# Patient Record
Sex: MALE | Race: WHITE
[De-identification: names, ages, dates, MRNs, and addresses within clinical notes are randomized per-mention and may not be internally consistent; named-entity substitution may affect disease eponyms.]

---

## 2018-05-30 ENCOUNTER — APPOINTMENT (OUTPATIENT)
Dept: PULMONOLOGY | Facility: CLINIC | Age: 58
End: 2018-05-30
Payer: COMMERCIAL

## 2018-05-30 ENCOUNTER — NON-APPOINTMENT (OUTPATIENT)
Age: 58
End: 2018-05-30

## 2018-05-30 VITALS
DIASTOLIC BLOOD PRESSURE: 92 MMHG | HEART RATE: 98 BPM | BODY MASS INDEX: 31.86 KG/M2 | OXYGEN SATURATION: 98 % | SYSTOLIC BLOOD PRESSURE: 132 MMHG | WEIGHT: 203 LBS | HEIGHT: 67 IN | RESPIRATION RATE: 17 BRPM

## 2018-05-30 DIAGNOSIS — Z78.9 OTHER SPECIFIED HEALTH STATUS: ICD-10-CM

## 2018-05-30 DIAGNOSIS — Z81.8 FAMILY HISTORY OF OTHER MENTAL AND BEHAVIORAL DISORDERS: ICD-10-CM

## 2018-05-30 DIAGNOSIS — Z86.59 PERSONAL HISTORY OF OTHER MENTAL AND BEHAVIORAL DISORDERS: ICD-10-CM

## 2018-05-30 DIAGNOSIS — Z82.49 FAMILY HISTORY OF ISCHEMIC HEART DISEASE AND OTHER DISEASES OF THE CIRCULATORY SYSTEM: ICD-10-CM

## 2018-05-30 DIAGNOSIS — Z83.3 FAMILY HISTORY OF DIABETES MELLITUS: ICD-10-CM

## 2018-05-30 DIAGNOSIS — Z83.49 FAMILY HISTORY OF OTHER ENDOCRINE, NUTRITIONAL AND METABOLIC DISEASES: ICD-10-CM

## 2018-05-30 DIAGNOSIS — Z86.39 PERSONAL HISTORY OF OTHER ENDOCRINE, NUTRITIONAL AND METABOLIC DISEASE: ICD-10-CM

## 2018-05-30 DIAGNOSIS — Z80.7 FAMILY HISTORY OF OTHER MALIGNANT NEOPLASMS OF LYMPHOID, HEMATOPOIETIC AND RELATED TISSUES: ICD-10-CM

## 2018-05-30 PROBLEM — Z00.00 ENCOUNTER FOR PREVENTIVE HEALTH EXAMINATION: Status: ACTIVE | Noted: 2018-05-30

## 2018-05-30 PROCEDURE — 99204 OFFICE O/P NEW MOD 45 MIN: CPT | Mod: 25

## 2018-05-30 PROCEDURE — 94010 BREATHING CAPACITY TEST: CPT

## 2018-05-30 PROCEDURE — 71046 X-RAY EXAM CHEST 2 VIEWS: CPT

## 2018-05-30 RX ORDER — MONTELUKAST SODIUM 10 MG/1
10 TABLET, FILM COATED ORAL
Qty: 1 | Refills: 2 | Status: ACTIVE | COMMUNITY
Start: 2018-05-30 | End: 1900-01-01

## 2018-05-30 RX ORDER — PANTOPRAZOLE 20 MG/1
20 TABLET, DELAYED RELEASE ORAL
Qty: 60 | Refills: 0 | Status: ACTIVE | COMMUNITY
Start: 2017-05-12

## 2018-05-30 RX ORDER — CLOBETASOL PROPIONATE 0.5 MG/G
0.05 OINTMENT TOPICAL
Qty: 15 | Refills: 0 | Status: ACTIVE | COMMUNITY
Start: 2018-05-29

## 2018-05-30 RX ORDER — CLONAZEPAM 0.25 MG/1
0.25 TABLET, ORALLY DISINTEGRATING ORAL
Qty: 90 | Refills: 0 | Status: ACTIVE | COMMUNITY
Start: 2017-12-03

## 2018-05-30 RX ORDER — ALBUTEROL SULFATE 90 UG/1
108 (90 BASE) AEROSOL, METERED RESPIRATORY (INHALATION) EVERY 6 HOURS
Qty: 1 | Refills: 3 | Status: ACTIVE | COMMUNITY
Start: 2018-05-30 | End: 1900-01-01

## 2018-05-30 RX ORDER — CEPHALEXIN 500 MG/1
500 CAPSULE ORAL
Qty: 20 | Refills: 0 | Status: DISCONTINUED | COMMUNITY
Start: 2018-04-21

## 2018-05-30 RX ORDER — ROSUVASTATIN CALCIUM 10 MG/1
10 TABLET, FILM COATED ORAL
Qty: 30 | Refills: 0 | Status: ACTIVE | COMMUNITY
Start: 2018-04-28

## 2018-05-30 RX ORDER — FLUOCINOLONE ACETONIDE 0.25 MG/G
0.03 OINTMENT TOPICAL
Qty: 15 | Refills: 0 | Status: ACTIVE | COMMUNITY
Start: 2018-05-29

## 2018-05-30 RX ORDER — ALBUTEROL SULFATE 2.5 MG/3ML
(2.5 MG/3ML) SOLUTION RESPIRATORY (INHALATION)
Qty: 225 | Refills: 0 | Status: ACTIVE | COMMUNITY
Start: 2018-05-19

## 2018-07-25 ENCOUNTER — APPOINTMENT (OUTPATIENT)
Dept: PULMONOLOGY | Facility: CLINIC | Age: 58
End: 2018-07-25
Payer: COMMERCIAL

## 2018-07-25 ENCOUNTER — NON-APPOINTMENT (OUTPATIENT)
Age: 58
End: 2018-07-25

## 2018-07-25 VITALS
SYSTOLIC BLOOD PRESSURE: 130 MMHG | OXYGEN SATURATION: 98 % | RESPIRATION RATE: 16 BRPM | HEIGHT: 66 IN | HEART RATE: 74 BPM | DIASTOLIC BLOOD PRESSURE: 80 MMHG | WEIGHT: 198 LBS | BODY MASS INDEX: 31.82 KG/M2

## 2018-07-25 PROCEDURE — 99214 OFFICE O/P EST MOD 30 MIN: CPT | Mod: 25

## 2018-07-25 PROCEDURE — 94010 BREATHING CAPACITY TEST: CPT

## 2018-07-25 PROCEDURE — 94727 GAS DIL/WSHOT DETER LNG VOL: CPT

## 2018-07-25 PROCEDURE — 94729 DIFFUSING CAPACITY: CPT

## 2018-07-25 RX ORDER — HYDROCODONE BITARTRATE AND HOMATROPINE METHYLBROMIDE 5; 1.5 MG/5ML; MG/5ML
5-1.5 SYRUP ORAL
Qty: 100 | Refills: 0 | Status: DISCONTINUED | COMMUNITY
Start: 2018-05-08 | End: 2018-07-25

## 2018-07-25 RX ORDER — PREDNISONE 10 MG/1
10 TABLET ORAL
Qty: 50 | Refills: 0 | Status: DISCONTINUED | COMMUNITY
Start: 2018-05-17 | End: 2018-07-25

## 2018-11-20 ENCOUNTER — LABORATORY RESULT (OUTPATIENT)
Age: 58
End: 2018-11-20

## 2018-11-20 LAB
24R-OH-CALCIDIOL SERPL-MCNC: 71.5 PG/ML
25(OH)D3 SERPL-MCNC: 34.5 NG/ML
ALBUMIN SERPL ELPH-MCNC: 4.3 G/DL
ALP BLD-CCNC: 48 U/L
ALT SERPL-CCNC: 25 U/L
ANION GAP SERPL CALC-SCNC: 13 MMOL/L
AST SERPL-CCNC: 24 U/L
BASOPHILS # BLD AUTO: 0.04 K/UL
BASOPHILS NFR BLD AUTO: 0.7 %
BILIRUB SERPL-MCNC: 0.6 MG/DL
BUN SERPL-MCNC: 21 MG/DL
CALCIUM SERPL-MCNC: 9.7 MG/DL
CHLORIDE SERPL-SCNC: 101 MMOL/L
CHOLEST SERPL-MCNC: 176 MG/DL
CHOLEST/HDLC SERPL: 3.2 RATIO
CO2 SERPL-SCNC: 27 MMOL/L
CREAT SERPL-MCNC: 1.03 MG/DL
EOSINOPHIL # BLD AUTO: 0.11 K/UL
EOSINOPHIL NFR BLD AUTO: 2 %
GLUCOSE SERPL-MCNC: 112 MG/DL
HBA1C MFR BLD HPLC: 5.5 %
HCT VFR BLD CALC: 47 %
HDLC SERPL-MCNC: 55 MG/DL
HGB BLD-MCNC: 16.4 G/DL
IGE SER-MCNC: 4 IU/ML
IMM GRANULOCYTES NFR BLD AUTO: 0.2 %
LDLC SERPL CALC-MCNC: 105 MG/DL
LYMPHOCYTES # BLD AUTO: 1.44 K/UL
LYMPHOCYTES NFR BLD AUTO: 26.2 %
MAN DIFF?: NORMAL
MCHC RBC-ENTMCNC: 31.6 PG
MCHC RBC-ENTMCNC: 34.9 GM/DL
MCV RBC AUTO: 90.6 FL
MONOCYTES # BLD AUTO: 0.44 K/UL
MONOCYTES NFR BLD AUTO: 8 %
NEUTROPHILS # BLD AUTO: 3.46 K/UL
NEUTROPHILS NFR BLD AUTO: 62.9 %
PLATELET # BLD AUTO: 157 K/UL
POTASSIUM SERPL-SCNC: 5 MMOL/L
PROT SERPL-MCNC: 6.8 G/DL
RBC # BLD: 5.19 M/UL
RBC # FLD: 14.9 %
SODIUM SERPL-SCNC: 141 MMOL/L
T3FREE SERPL-MCNC: 3.3 PG/ML
T4 FREE SERPL-MCNC: 1.2 NG/DL
TRIGL SERPL-MCNC: 80 MG/DL
TSH SERPL-ACNC: 2 UIU/ML
WBC # FLD AUTO: 5.5 K/UL

## 2018-11-27 ENCOUNTER — NON-APPOINTMENT (OUTPATIENT)
Age: 58
End: 2018-11-27

## 2018-11-27 ENCOUNTER — APPOINTMENT (OUTPATIENT)
Dept: PULMONOLOGY | Facility: CLINIC | Age: 58
End: 2018-11-27
Payer: COMMERCIAL

## 2018-11-27 VITALS
WEIGHT: 207 LBS | DIASTOLIC BLOOD PRESSURE: 80 MMHG | HEIGHT: 68 IN | HEART RATE: 64 BPM | SYSTOLIC BLOOD PRESSURE: 126 MMHG | BODY MASS INDEX: 31.37 KG/M2 | OXYGEN SATURATION: 98 %

## 2018-11-27 DIAGNOSIS — R05 COUGH: ICD-10-CM

## 2018-11-27 PROCEDURE — 95012 NITRIC OXIDE EXP GAS DETER: CPT

## 2018-11-27 PROCEDURE — 99214 OFFICE O/P EST MOD 30 MIN: CPT | Mod: 25

## 2018-11-27 PROCEDURE — 94010 BREATHING CAPACITY TEST: CPT

## 2018-11-27 NOTE — REASON FOR VISIT
[Follow-Up] : a follow-up visit [FreeTextEntry1] : allergic rhinitis, chronic cough, GERD, overweight, RAD, snoring and SOB

## 2018-11-27 NOTE — ASSESSMENT
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 with a history of HLD, non-smoker, anxiety, adult onset allergies, and mild asthma who now comes in for pulmonary re-evaluation.\par \par His shortness of breath is felt to be multifactorial due to:\par -overweight\par -out of shape\par -poor breathing mechanics\par -no evidence of cardiac disease\par -consistent with asthmatic bronchitis\par \par His asthma has contribution from:\par -underlying allergic rhinitis\par -secondary GERD \par \par problem 1: Asthma\par -add Ventolin 2 puffs Q6H\par -continue albuterol by the nebulizer BID, prn \par -followed by Advair 250 at 1 inhalation BID - off \par -continue Singulair 10 mg QHS \par \par -Asthma is  believed to be caused by inherited (genetic) and environmental factor, but its exact cause is unknown. Asthma may be triggered by allergens, lung infections, or irritants in the air. Asthma triggers are different for each person\par -Inhaler technique reviewed as well as oral hygiene techniques reviewed with patient. Avoidance of cold air, extremes of temperature, rescue inhaler should be used before exercise. Order of medication reviewed with patient. Recommended use of a cool mist humidifier in the bedroom. \par \par problem 2: allergic rhinitis\par -continue Flonase Sensimist 1 sniff/nostril BID\par -continue Allegra 180 mg QAM \par -continue Clarinex 5 mg QHS \par \par -Environmental measures for allergies were encouraged including mattress and pillow cover, air purifier, and environmental controls.\par \par problem 3: GERD / LPRD\par -continue Protonix 40 mg before breakfast \par -continue Zantac 300 mg QHS \par -Rule of 2s: avoid eating too much, eating too late, eating too spicy, eating two hours before bed\par -Things to avoid including overeating, spicy foods, tight clothing, eating within three hours of bed, this list is not all inclusive. \par -For treatment of reflux, possible options discussed including diet control, H2 blockers, PPIs, as well as coating motility agents discussed as treatment options. Timing of meals and proximity of last meal to sleep were discussed. If symptoms persist, a formal gastrointestinal evaluation is needed. \par \par problem 4: r/o allergies\par - S/p blood work (all negative): IgE level, eosinophil level, vitamin D level, food IgE level, and asthma profile \par \par problem 5: poor breathing mechanics\par -Proper breathing techniques were reviewed with an emphasis of exhalation. Patient instructed to breath in for 1 second and out for four seconds. Patient was encouraged to not talk while walking.\par \par problem 6: ? Snoring\par -recommended to consider a sleep study if he becomes more symptomatic \par \par -Sleep apnea is associated with adverse clinical consequences which an affect most organ systems.  Cardiovascular disease risk includes arrhythmias, atrial fibrillation, hypertension, coronary artery disease, and stroke. Metabolic disorders include diabetes type 2, non-alcoholic fatty liver disease. Mood disorder especially depression; and cognitive decline especially in the elderly. Associations with  chronic reflux/Quesada’s esophagus some but not all inclusive. \par -Reasons to assess this include arousal consistent with hypopnea; respiratory events most prominent in REM sleep or supine position; therefore sleep staging and body position are important for accurate diagnosis and estimation of AHI. \par \par problem 7: overweight / out of shape\par -Weight loss, exercise, and diet control were discussed and are highly encouraged. Treatment options were given such as, aqua therapy, and contacting a nutritionist. Recommended to use the elliptical, stationary bike, less use of treadmill.  Obesity is associated with worsening asthma, shortness of breath, and potential for cardiac disease, diabetes, and other underlying medical conditions.\par \par problem 8: blood work (negative)\par - S/p thyroid function tests, lipid profile, Hgb A1C, free and total testosterone level, CMP, CBC with diff \par \par problem 9: health maintenance \par - Received an influenza vaccine 2018\par -recommended strep pneumonia vaccines: Prevnar-13 vaccine, followed by Pneumo vaccine 23 one year following\par -recommended early intervention for URIs\par -recommended regular osteoporosis evaluations\par -recommended early dermatological evaluations\par -recommended after the age of 50 to the age of 70, colonoscopy every 5 years \par \par F/U in 6 months spi \par He is encouraged to call with any changes, concerns, or questions.

## 2018-11-27 NOTE — ADDENDUM
[FreeTextEntry1] : Documented by Jorge Tidwell acting as a scribe for Dr. Sami Ward on 11/27/18\par \par All medical record entries made by the Scribe were at my, Dr. Sami Ward's, direction and personally dictated by me on 11/27/18. I have reviewed the chart and agree that the record accurately reflects my personal performance of the history, physical exam, assessment and plan. I have also personally directed, reviewed, and agree with the discharge instructions. \par \par \par \par \par

## 2018-11-27 NOTE — HISTORY OF PRESENT ILLNESS
[FreeTextEntry1] : Mr. Mcmahan is a 58 year old male with a history of allergic rhinitis, chronic cough, GERD, overweight, RAD, snoring and SOB presenting to the office today for a follow up visit. His chief complaint is sleep\par - He comes in stating that he feels fine. \par - He states that he has been sleeping more, but he still feels that he can be sleeping more. \par - On a scale of 1-10, his energy level is a 8-9 \par - He has not been snoring as much as he had been. \par -  He denies any headaches, nausea, vomiting, fever, chills, sweats, chest pain, chest pressure, palpitations, SOB, coughing, wheezing,  diarrhea, constipation, dysphagia, myalgias, dizziness, leg swelling, leg pain, itchy eyes, itchy ears, heartburn, reflux, or sour taste in the mouth.

## 2018-11-29 ENCOUNTER — TRANSCRIPTION ENCOUNTER (OUTPATIENT)
Age: 58
End: 2018-11-29

## 2018-11-30 LAB
A ALTERNATA IGE QN: <0.1 KUA/L
A FUMIGATUS IGE QN: <0.1 KUA/L
C ALBICANS IGE QN: <0.1 KUA/L
C HERBARUM IGE QN: <0.1 KUA/L
CAT DANDER IGE QN: <0.1 KUA/L
CLAM IGE QN: <0.1 KUA/L
CODFISH IGE QN: <0.1 KUA/L
COMMON RAGWEED IGE QN: NORMAL
CORN IGE QN: <0.1 KUA/L
COW MILK IGE QN: <0.1 KUA/L
D FARINAE IGE QN: 0.81 KUA/L
D PTERONYSS IGE QN: 0.34 KUA/L
DEPRECATED A ALTERNATA IGE RAST QL: 0
DEPRECATED A FUMIGATUS IGE RAST QL: 0
DEPRECATED C ALBICANS IGE RAST QL: 0
DEPRECATED C HERBARUM IGE RAST QL: 0
DEPRECATED CAT DANDER IGE RAST QL: 0
DEPRECATED CLAM IGE RAST QL: 0
DEPRECATED CODFISH IGE RAST QL: 0
DEPRECATED COMMON RAGWEED IGE RAST QL: NORMAL
DEPRECATED CORN IGE RAST QL: 0
DEPRECATED COW MILK IGE RAST QL: 0
DEPRECATED D FARINAE IGE RAST QL: ABNORMAL
DEPRECATED D PTERONYSS IGE RAST QL: NORMAL
DEPRECATED DOG DANDER IGE RAST QL: NORMAL
DEPRECATED EGG WHITE IGE RAST QL: 0
DEPRECATED M RACEMOSUS IGE RAST QL: 0
DEPRECATED PEANUT IGE RAST QL: 0
DEPRECATED ROACH IGE RAST QL: 0
DEPRECATED SCALLOP IGE RAST QL: <0.1 KUA/L
DEPRECATED SESAME SEED IGE RAST QL: 0
DEPRECATED SHRIMP IGE RAST QL: NORMAL
DEPRECATED SOYBEAN IGE RAST QL: NORMAL
DEPRECATED TIMOTHY IGE RAST QL: NORMAL
DEPRECATED WALNUT IGE RAST QL: 0
DEPRECATED WHEAT IGE RAST QL: 0
DEPRECATED WHITE OAK IGE RAST QL: 0
DOG DANDER IGE QN: 0.19 KUA/L
EGG WHITE IGE QN: <0.1 KUA/L
M RACEMOSUS IGE QN: <0.1 KUA/L
PEANUT IGE QN: <0.1 KUA/L
ROACH IGE QN: <0.1 KUA/L
SCALLOP IGE QN: 0
SCALLOP IGE QN: 0.12 KUA/L
SESAME SEED IGE QN: <0.1 KUA/L
SOYBEAN IGE QN: NORMAL
TIMOTHY IGE QN: NORMAL
WALNUT IGE QN: <0.1 KUA/L
WHEAT IGE QN: <0.1 KUA/L
WHITE OAK IGE QN: <0.1 KUA/L

## 2019-07-21 ENCOUNTER — RX RENEWAL (OUTPATIENT)
Age: 59
End: 2019-07-21

## 2019-07-21 RX ORDER — DESLORATADINE 5 MG/1
5 TABLET ORAL
Qty: 90 | Refills: 1 | Status: ACTIVE | COMMUNITY
Start: 2019-07-21 | End: 1900-01-01

## 2019-07-21 RX ORDER — MONTELUKAST 10 MG/1
10 TABLET, FILM COATED ORAL
Qty: 90 | Refills: 4 | Status: ACTIVE | COMMUNITY
Start: 2019-07-21 | End: 1900-01-01

## 2020-11-24 ENCOUNTER — TRANSCRIPTION ENCOUNTER (OUTPATIENT)
Age: 60
End: 2020-11-24

## 2021-09-22 ENCOUNTER — TRANSCRIPTION ENCOUNTER (OUTPATIENT)
Age: 61
End: 2021-09-22

## 2021-10-20 ENCOUNTER — TRANSCRIPTION ENCOUNTER (OUTPATIENT)
Age: 61
End: 2021-10-20

## 2022-07-15 ENCOUNTER — APPOINTMENT (OUTPATIENT)
Dept: ORTHOPEDIC SURGERY | Facility: CLINIC | Age: 62
End: 2022-07-15

## 2022-07-15 VITALS — BODY MASS INDEX: 26.37 KG/M2 | WEIGHT: 170 LBS | HEIGHT: 67.5 IN

## 2022-07-15 DIAGNOSIS — Q66.10 CONGEN TALIPES CALCANEOVARUS, UNSP FOOT: ICD-10-CM

## 2022-07-15 DIAGNOSIS — M76.72 PERONEAL TENDINITIS, LEFT LEG: ICD-10-CM

## 2022-07-15 PROCEDURE — 73610 X-RAY EXAM OF ANKLE: CPT | Mod: LT

## 2022-07-15 NOTE — DISCUSSION/SUMMARY
[de-identified] : Recommend peroneal strengthening. \par Lateral heel wedges , would like to review orthotics

## 2022-07-15 NOTE — HISTORY OF PRESENT ILLNESS
[3] : 3 [7] : 7 [Dull/Aching] : dull/aching [Localized] : localized [Throbbing] : throbbing [de-identified] : 7/15/22: L ankle injury on 7/1/22 after [place] the ankle on a trail. Pain is lateral, throbbing pain that night. Has been using an aircast. WBAT; again rolled the ankle a few days ago. No clicking.  [] : Post Surgical Visit: no [FreeTextEntry1] : L ankle [FreeTextEntry3] : 6/30/22 [FreeTextEntry5] : 62 Y/O RHD M [place] L ankle BRANDEE: A roll of the ankle on above mentioned date No prior TX  [de-identified] : none

## 2022-07-15 NOTE — IMAGING
[Left] : left ankle [Weight -] : weightbearing [There are no fractures, subluxations or dislocations. No significant abnormalities are seen] : There are no fractures, subluxations or dislocations. No significant abnormalities are seen [FreeTextEntry9] : pas cavo varus with deltoid calcifications.

## 2023-01-24 ENCOUNTER — NON-APPOINTMENT (OUTPATIENT)
Age: 63
End: 2023-01-24

## 2023-01-25 ENCOUNTER — APPOINTMENT (OUTPATIENT)
Dept: PULMONOLOGY | Facility: CLINIC | Age: 63
End: 2023-01-25
Payer: COMMERCIAL

## 2023-01-25 ENCOUNTER — NON-APPOINTMENT (OUTPATIENT)
Age: 63
End: 2023-01-25

## 2023-01-25 VITALS
HEART RATE: 60 BPM | SYSTOLIC BLOOD PRESSURE: 120 MMHG | WEIGHT: 173 LBS | OXYGEN SATURATION: 99 % | TEMPERATURE: 98.3 F | BODY MASS INDEX: 26.84 KG/M2 | RESPIRATION RATE: 17 BRPM | HEIGHT: 67.5 IN | DIASTOLIC BLOOD PRESSURE: 78 MMHG

## 2023-01-25 DIAGNOSIS — J30.9 ALLERGIC RHINITIS, UNSPECIFIED: ICD-10-CM

## 2023-01-25 DIAGNOSIS — Z86.79 PERSONAL HISTORY OF OTHER DISEASES OF THE CIRCULATORY SYSTEM: ICD-10-CM

## 2023-01-25 DIAGNOSIS — Z86.39 PERSONAL HISTORY OF OTHER ENDOCRINE, NUTRITIONAL AND METABOLIC DISEASE: ICD-10-CM

## 2023-01-25 DIAGNOSIS — E66.3 OVERWEIGHT: ICD-10-CM

## 2023-01-25 DIAGNOSIS — J45.909 UNSPECIFIED ASTHMA, UNCOMPLICATED: ICD-10-CM

## 2023-01-25 DIAGNOSIS — K21.9 GASTRO-ESOPHAGEAL REFLUX DISEASE W/OUT ESOPHAGITIS: ICD-10-CM

## 2023-01-25 DIAGNOSIS — R06.02 SHORTNESS OF BREATH: ICD-10-CM

## 2023-01-25 DIAGNOSIS — J01.90 ACUTE SINUSITIS, UNSPECIFIED: ICD-10-CM

## 2023-01-25 DIAGNOSIS — R06.83 SNORING: ICD-10-CM

## 2023-01-25 PROCEDURE — 94010 BREATHING CAPACITY TEST: CPT

## 2023-01-25 PROCEDURE — 71046 X-RAY EXAM CHEST 2 VIEWS: CPT

## 2023-01-25 PROCEDURE — 99204 OFFICE O/P NEW MOD 45 MIN: CPT | Mod: 25

## 2023-01-25 PROCEDURE — 94618 PULMONARY STRESS TESTING: CPT

## 2023-01-25 PROCEDURE — 95012 NITRIC OXIDE EXP GAS DETER: CPT

## 2023-01-25 RX ORDER — RANITIDINE 300 MG/1
300 TABLET ORAL
Qty: 90 | Refills: 1 | Status: DISCONTINUED | COMMUNITY
Start: 2018-05-30 | End: 2023-01-25

## 2023-01-25 RX ORDER — VALSARTAN 320 MG/1
320 TABLET, COATED ORAL
Refills: 0 | Status: ACTIVE | COMMUNITY

## 2023-01-25 RX ORDER — SERTRALINE HYDROCHLORIDE 50 MG/1
50 TABLET, FILM COATED ORAL
Qty: 30 | Refills: 0 | Status: DISCONTINUED | COMMUNITY
Start: 2018-02-18 | End: 2023-01-25

## 2023-01-25 RX ORDER — DOXEPIN HYDROCHLORIDE 10 MG/1
10 CAPSULE ORAL
Qty: 90 | Refills: 0 | Status: DISCONTINUED | COMMUNITY
Start: 2018-04-09 | End: 2023-01-25

## 2023-01-25 RX ORDER — FLUTICASONE PROPIONATE AND SALMETEROL 50; 250 UG/1; UG/1
250-50 POWDER RESPIRATORY (INHALATION)
Qty: 60 | Refills: 0 | Status: DISCONTINUED | COMMUNITY
Start: 2018-05-19 | End: 2023-01-25

## 2023-01-25 RX ORDER — AMOXICILLIN AND CLAVULANATE POTASSIUM 875; 125 MG/1; MG/1
875-125 TABLET, COATED ORAL
Qty: 28 | Refills: 0 | Status: ACTIVE | COMMUNITY
Start: 2023-01-25 | End: 1900-01-01

## 2023-01-25 NOTE — ASSESSMENT
[FreeTextEntry1] : Mr. Mcmahan is a 62 year old male with a history of nonsmoker, allergic rhinitis, chronic cough, GERD, overweight, RAD, snoring, HLD, HTN, and SOB presenting to the office today for a follow up visit for active asthma/ PND \par \par His shortness of breath is felt to be multifactorial due to:\par -overweight\par -out of shape\par -poor breathing mechanics\par -no evidence of cardiac disease\par -consistent with mild intermittent asthma \par \par His asthma has contribution from:\par -underlying allergic rhinitis\par -secondary GERD \par \par problem 1: Asthma (active s/p steroid) \par -add Breztri 2 puffs BID (add spacer) \par -continue Ventolin 2 puffs Q6H\par -continue albuterol by the nebulizer BID, prn \par -continue Singulair 10 mg QHS \par -Asthma is  believed to be caused by inherited (genetic) and environmental factor, but its exact cause is unknown. Asthma may be triggered by allergens, lung infections, or irritants in the air. Asthma triggers are different for each person\par -Inhaler technique reviewed as well as oral hygiene techniques reviewed with patient. Avoidance of cold air, extremes of temperature, rescue inhaler should be used before exercise. Order of medication reviewed with patient. Recommended use of a cool mist humidifier in the bedroom. \par \par Problem 2A: Acute sinusitis \par -add Augmentin 875 mg BID (2 weeks)\par -Patient has a clinical scenario most c/w acute sinusitis the etiology of which is unknown (bacterial/viral/fungal). Hydration, steaming, intranasal saline is needed. \par \par problem 2: allergic rhinitis (active) \par -add Dymista 1 sniff each nostril BID \par -continue Allegra 180 mg QAM \par -continue Clarinex 5 mg QHS \par -Environmental measures for allergies were encouraged including mattress and pillow cover, air purifier, and environmental controls.\par \par problem 3: GERD / LPRD (controlled) \par -continue Protonix 40 mg before breakfast \par -Rule of 2s: avoid eating too much, eating too late, eating too spicy, eating two hours before bed\par -Things to avoid including overeating, spicy foods, tight clothing, eating within three hours of bed, this list is not all inclusive. \par -For treatment of reflux, possible options discussed including diet control, H2 blockers, PPIs, as well as coating motility agents discussed as treatment options. Timing of meals and proximity of last meal to sleep were discussed. If symptoms persist, a formal gastrointestinal evaluation is needed. \par \par problem 4: r/o allergies\par - S/p blood work (all negative): IgE level, eosinophil level, vitamin D level, food IgE level, and asthma profile \par \par problem 5: poor breathing mechanics\par -Proper breathing techniques were reviewed with an emphasis of exhalation. Patient instructed to breath in for 1 second and out for four seconds. Patient was encouraged to not talk while walking.\par \par problem 6: ? Snoring\par -recommended to consider a sleep study if he becomes more symptomatic \par \par -Sleep apnea is associated with adverse clinical consequences which an affect most organ systems.  Cardiovascular disease risk includes arrhythmias, atrial fibrillation, hypertension, coronary artery disease, and stroke. Metabolic disorders include diabetes type 2, non-alcoholic fatty liver disease. Mood disorder especially depression; and cognitive decline especially in the elderly. Associations with  chronic reflux/Quesada’s esophagus some but not all inclusive. \par -Reasons to assess this include arousal consistent with hypopnea; respiratory events most prominent in REM sleep or supine position; therefore sleep staging and body position are important for accurate diagnosis and estimation of AHI. \par \par problem 7: overweight / out of shape\par -recommended Jj Christensen [facility] [place] Stretches on YouTube \par -Weight loss, exercise, and diet control were discussed and are highly encouraged. Treatment options were given such as, aqua therapy, and contacting a nutritionist. Recommended to use the elliptical, stationary bike, less use of treadmill.  Obesity is associated with worsening asthma, shortness of breath, and potential for cardiac disease, diabetes, and other underlying medical conditions.\par \par problem 8: blood work (negative)\par - S/p thyroid function tests, lipid profile, Hgb A1C, free and total testosterone level, CMP, CBC with diff \par \par problem 9: health maintenance \par -recommended SaNOtize nasal spray \par - Received an influenza vaccine - 2022\par -s/p COVID-19 vaccine x4\par -recommended strep pneumonia vaccines: Prevnar-13 vaccine, followed by Pneumo vaccine 23 one year following\par -recommended early intervention for URIs\par -recommended regular osteoporosis evaluations\par -recommended early dermatological evaluations\par -recommended after the age of 50 to the age of 70, colonoscopy every 5 years \par \par F/U in 6 months spi \par He is encouraged to call with any changes, concerns, or questions.

## 2023-01-25 NOTE — ADDENDUM
[FreeTextEntry1] : Documented by Helena Jimenes acting as a scribe for Dr. Sami Ward on 01/25/2023 .\par \par All medical record entries made by the Scribe were at my, Dr. Sami Ward's, direction and personally dictated by me on 01/25/2023. I have reviewed the chart and agree that the record accurately reflects my personal performance of the history, physical exam, assessment and plan. I have also personally directed, reviewed, and agree with the discharge instructions. \par \par \par \par

## 2023-01-25 NOTE — PHYSICAL EXAM
[No Acute Distress] : no acute distress [Normal Oropharynx] : normal oropharynx [III] : Mallampati Class: III [Normal Appearance] : normal appearance [No Neck Mass] : no neck mass [Normal Rate/Rhythm] : normal rate/rhythm [Normal S1, S2] : normal s1, s2 [No Murmurs] : no murmurs [No Resp Distress] : no resp distress [Clear to Auscultation Bilaterally] : clear to auscultation bilaterally [Benign] : benign [No Abnormalities] : no abnormalities [Normal Gait] : normal gait [No Clubbing] : no clubbing [No Cyanosis] : no cyanosis [No Edema] : no edema [FROM] : FROM [Normal Color/ Pigmentation] : normal color/ pigmentation [No Focal Deficits] : no focal deficits [Oriented x3] : oriented x3 [Normal Affect] : normal affect [TextBox_68] : I:E ratio 1:3; clear

## 2023-01-25 NOTE — HISTORY OF PRESENT ILLNESS
[FreeTextEntry1] : Mr. Mcmahan is a 62 year old male with a history of allergic rhinitis, chronic cough, GERD, overweight, RAD, snoring, HLD, HTN, and SOB presenting to the office today for a follow up visit. His chief complaint is \par \par -he notes mild intermittent coughing onset couple weeks\par -he notes starting prednisone course and advair due to cough\par -he notes elevated blood pressure near the end of the prednisone course which persisted with subsequent intermittent tachycardia and PAC in [place] \par -he notes recent frontal headaches\par -he notes sinus congestion \par -he notes sleep is stable \par -he notes getting about 6-7 hrs of sleep \par -he notes cardio on the elliptical and body weight exercises \par -he notes his memory and concentration are stable \par -he denies snoring \par -he notes mild dyspnea \par -he notes unable to tolerate Symbicort due to tachycardia \par \par -he denies any headaches, nausea, emesis, fever, chills, sweats, chest pain, chest pressure, wheezing, constipation, diarrhea, vertigo, dysphagia, heartburn, reflux, itchy eyes, itchy ears, leg swelling, leg pain, arthralgias, myalgias, or sour taste in the mouth.

## 2023-01-25 NOTE — PROCEDURE
[FreeTextEntry1] : CXR reveals a normal sized heart; no evidence of infiltrate or effusion--a normal appearing chest radiograph \par \par PFT reveals normal flows, with an FEV1 of 3.74 L, which is 114% of predicted, with a normal flow volume loop. \par \par 6 minute walk test reveals a low saturation of 98% with no evidence of dyspnea or fatigue; walked 586.8 meters \par \par FENO was 21; a normal value being less than 25\par Fractional exhaled nitric oxide (FENO) is regarded as a simple, noninvasive method for assessing eosinophilic airway inflammation. Produced by a variety of cells within the lung, nitric oxide (NO) concentrations are generally low in healthy individuals. However, high concentrations of NO appear to be involved in nonspecific host defense mechanisms and chronic inflammatory diseases such as asthma. The American Thor[employer] Society (ATS) therefore has recommended using FENO to aid in the diagnosis and monitoring of eosinophilic airway inflammation and asthma, and for identifying steroid responsive individuals whose chronic respiratory symptoms may be caused by airway inflammation.

## 2023-01-26 RX ORDER — DESLORATADINE 5 MG/1
5 TABLET ORAL DAILY
Qty: 90 | Refills: 1 | Status: ACTIVE | COMMUNITY
Start: 2023-01-25 | End: 1900-01-01

## 2023-01-26 RX ORDER — DESLORATADINE 5 MG/1
5 TABLET, FILM COATED ORAL
Qty: 1 | Refills: 1 | Status: DISCONTINUED | COMMUNITY
Start: 2018-05-30 | End: 2023-01-26

## 2023-01-26 RX ORDER — LEVOCETIRIZINE DIHYDROCHLORIDE 5 MG/1
5 TABLET ORAL
Qty: 90 | Refills: 1 | Status: ACTIVE | COMMUNITY
Start: 2023-01-26 | End: 1900-01-01

## 2023-01-27 RX ORDER — FLUTICASONE PROPIONATE 50 UG/1
50 SPRAY, METERED NASAL TWICE DAILY
Qty: 3 | Refills: 1 | Status: ACTIVE | COMMUNITY
Start: 2023-01-27 | End: 1900-01-01

## 2023-01-27 RX ORDER — AZELASTINE HYDROCHLORIDE AND FLUTICASONE PROPIONATE 137; 50 UG/1; UG/1
137-50 SPRAY, METERED NASAL
Qty: 1 | Refills: 2 | Status: DISCONTINUED | COMMUNITY
Start: 2023-01-25 | End: 2023-01-27

## 2023-03-07 ENCOUNTER — NON-APPOINTMENT (OUTPATIENT)
Age: 63
End: 2023-03-07

## 2023-03-07 DIAGNOSIS — D84.9 IMMUNODEFICIENCY, UNSPECIFIED: ICD-10-CM

## 2023-03-07 RX ORDER — SULFAMETHOXAZOLE AND TRIMETHOPRIM 800; 160 MG/1; MG/1
800-160 TABLET ORAL TWICE DAILY
Qty: 28 | Refills: 0 | Status: ACTIVE | COMMUNITY
Start: 2023-03-07 | End: 1900-01-01

## 2023-03-08 ENCOUNTER — TRANSCRIPTION ENCOUNTER (OUTPATIENT)
Age: 63
End: 2023-03-08

## 2023-03-08 LAB
BASOPHILS # BLD AUTO: 0.05 K/UL
BASOPHILS NFR BLD AUTO: 0.8 %
DEPRECATED KAPPA LC FREE/LAMBDA SER: 1.65 RATIO
EOSINOPHIL # BLD AUTO: 0.12 K/UL
EOSINOPHIL NFR BLD AUTO: 2 %
HCT VFR BLD CALC: 47.5 %
HGB BLD-MCNC: 16 G/DL
IGA SER QL IEP: 144 MG/DL
IGG SER QL IEP: 789 MG/DL
IGM SER QL IEP: 96 MG/DL
IMM GRANULOCYTES NFR BLD AUTO: 0.2 %
KAPPA LC CSF-MCNC: 0.99 MG/DL
KAPPA LC SERPL-MCNC: 1.63 MG/DL
LYMPHOCYTES # BLD AUTO: 2.17 K/UL
LYMPHOCYTES NFR BLD AUTO: 36.4 %
MAN DIFF?: NORMAL
MCHC RBC-ENTMCNC: 31.5 PG
MCHC RBC-ENTMCNC: 33.7 GM/DL
MCV RBC AUTO: 93.5 FL
MONOCYTES # BLD AUTO: 0.44 K/UL
MONOCYTES NFR BLD AUTO: 7.4 %
NEUTROPHILS # BLD AUTO: 3.17 K/UL
NEUTROPHILS NFR BLD AUTO: 53.2 %
PLATELET # BLD AUTO: 193 K/UL
RBC # BLD: 5.08 M/UL
RBC # FLD: 13.8 %
WBC # FLD AUTO: 5.96 K/UL

## 2023-03-09 ENCOUNTER — TRANSCRIPTION ENCOUNTER (OUTPATIENT)
Age: 63
End: 2023-03-09

## 2023-03-13 ENCOUNTER — NON-APPOINTMENT (OUTPATIENT)
Age: 63
End: 2023-03-13

## 2023-03-13 LAB
DEPRECATED S PNEUM 1 IGG SER-MCNC: 8.1 MCG/ML
DEPRECATED S PNEUM12 AB SER-ACNC: <0.4 MCG/ML
DEPRECATED S PNEUM14 AB SER-ACNC: 2.1 MCG/ML
DEPRECATED S PNEUM17 IGG SER IA-MCNC: 2.3 MCG/ML
DEPRECATED S PNEUM18 IGG SER IA-MCNC: 0.9 MCG/ML
DEPRECATED S PNEUM19 IGG SER-MCNC: 2.2 MCG/ML
DEPRECATED S PNEUM19 IGG SER-MCNC: 9 MCG/ML
DEPRECATED S PNEUM2 IGG SER-MCNC: 0.7 MCG/ML
DEPRECATED S PNEUM20 IGG SER-MCNC: 1 MCG/ML
DEPRECATED S PNEUM22 IGG SER-MCNC: 16.4 MCG/ML
DEPRECATED S PNEUM23 AB SER-ACNC: 20.7 MCG/ML
DEPRECATED S PNEUM3 AB SER-ACNC: 0.9 MCG/ML
DEPRECATED S PNEUM34 IGG SER-MCNC: 4.1 MCG/ML
DEPRECATED S PNEUM4 AB SER-ACNC: <0.4 MCG/ML
DEPRECATED S PNEUM5 IGG SER-MCNC: 1.7 MCG/ML
DEPRECATED S PNEUM6 IGG SER-MCNC: 2.7 MCG/ML
DEPRECATED S PNEUM7 IGG SER-ACNC: 5.8 MCG/ML
DEPRECATED S PNEUM8 AB SER-ACNC: 1.6 MCG/ML
DEPRECATED S PNEUM9 AB SER-ACNC: NORMAL MCG/ML
DEPRECATED S PNEUM9 IGG SER-MCNC: 1.6 MCG/ML
STREPTOCOCCUS PNEUMONIAE SEROTYPE 11A: 2.5 MCG/ML
STREPTOCOCCUS PNEUMONIAE SEROTYPE 15B: 9.7 MCG/ML
STREPTOCOCCUS PNEUMONIAE SEROTYPE 33F: 1.6 MCG/ML

## 2023-03-17 LAB
IGG SUBSET TOTAL IGG: 779 MG/DL
IGG1 SER-MCNC: 348 MG/DL
IGG2 SER-MCNC: 341 MG/DL
IGG3 SER-MCNC: 39 MG/DL
IGG4 SER-MCNC: 29 MG/DL

## 2023-07-28 ENCOUNTER — APPOINTMENT (OUTPATIENT)
Dept: PULMONOLOGY | Facility: CLINIC | Age: 63
End: 2023-07-28

## 2024-02-05 RX ORDER — AZELASTINE HYDROCHLORIDE 137 UG/1
0.1 SPRAY, METERED NASAL TWICE DAILY
Qty: 3 | Refills: 1 | Status: ACTIVE | COMMUNITY
Start: 2023-01-27 | End: 1900-01-01

## 2024-02-07 ENCOUNTER — RX RENEWAL (OUTPATIENT)
Age: 64
End: 2024-02-07

## 2025-03-15 ENCOUNTER — RX RENEWAL (OUTPATIENT)
Age: 65
End: 2025-03-15

## 2025-03-24 ENCOUNTER — RX RENEWAL (OUTPATIENT)
Age: 65
End: 2025-03-24